# Patient Record
Sex: FEMALE | URBAN - METROPOLITAN AREA
[De-identification: names, ages, dates, MRNs, and addresses within clinical notes are randomized per-mention and may not be internally consistent; named-entity substitution may affect disease eponyms.]

---

## 2017-01-18 ENCOUNTER — APPOINTMENT (RX ONLY)
Dept: URBAN - METROPOLITAN AREA CLINIC 348 | Facility: CLINIC | Age: 57
Setting detail: DERMATOLOGY
End: 2017-01-18

## 2017-01-18 DIAGNOSIS — L82.1 OTHER SEBORRHEIC KERATOSIS: ICD-10-CM

## 2017-01-18 DIAGNOSIS — L81.4 OTHER MELANIN HYPERPIGMENTATION: ICD-10-CM

## 2017-01-18 DIAGNOSIS — D22 MELANOCYTIC NEVI: ICD-10-CM
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
## 2017-01-18 DIAGNOSIS — L57.0 ACTINIC KERATOSIS: ICD-10-CM

## 2017-01-18 DIAGNOSIS — D18.0 HEMANGIOMA: ICD-10-CM

## 2017-01-18 DIAGNOSIS — Z80.8 FAMILY HISTORY OF MALIGNANT NEOPLASM OF OTHER ORGANS OR SYSTEMS: ICD-10-CM

## 2017-01-18 PROBLEM — D22.62 MELANOCYTIC NEVI OF LEFT UPPER LIMB, INCLUDING SHOULDER: Status: ACTIVE | Noted: 2017-01-18

## 2017-01-18 PROBLEM — D18.01 HEMANGIOMA OF SKIN AND SUBCUTANEOUS TISSUE: Status: ACTIVE | Noted: 2017-01-18

## 2017-01-18 PROBLEM — D22.5 MELANOCYTIC NEVI OF TRUNK: Status: ACTIVE | Noted: 2017-01-18

## 2017-01-18 PROBLEM — D22.61 MELANOCYTIC NEVI OF RIGHT UPPER LIMB, INCLUDING SHOULDER: Status: ACTIVE | Noted: 2017-01-18

## 2017-01-18 PROBLEM — D22.71 MELANOCYTIC NEVI OF RIGHT LOWER LIMB, INCLUDING HIP: Status: ACTIVE | Noted: 2017-01-18
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
## 2017-01-18 PROBLEM — D22.72 MELANOCYTIC NEVI OF LEFT LOWER LIMB, INCLUDING HIP: Status: ACTIVE | Noted: 2017-01-18

## 2017-01-18 PROCEDURE — ? LIQUID NITROGEN

## 2017-01-18 PROCEDURE — 17000 DESTRUCT PREMALG LESION: CPT

## 2017-01-18 PROCEDURE — 99202 OFFICE O/P NEW SF 15 MIN: CPT | Mod: 25

## 2017-01-18 PROCEDURE — ? COUNSELING

## 2017-01-18 ASSESSMENT — LOCATION ZONE DERM
LOCATION ZONE: ARM
LOCATION ZONE: TRUNK
LOCATION ZONE: LEG
LOCATION ZONE: NOSE

## 2017-01-18 ASSESSMENT — LOCATION SIMPLE DESCRIPTION DERM
LOCATION SIMPLE: LEFT UPPER BACK
LOCATION SIMPLE: NOSE
LOCATION SIMPLE: CHEST
LOCATION SIMPLE: LEFT THIGH
LOCATION SIMPLE: ABDOMEN
LOCATION SIMPLE: RIGHT FOREARM
LOCATION SIMPLE: RIGHT UPPER BACK
LOCATION SIMPLE: LOWER BACK
LOCATION SIMPLE: RIGHT THIGH
LOCATION SIMPLE: LEFT FOREARM

## 2017-01-18 ASSESSMENT — LOCATION DETAILED DESCRIPTION DERM
LOCATION DETAILED: SUPERIOR LUMBAR SPINE
LOCATION DETAILED: LEFT MEDIAL SUPERIOR CHEST
LOCATION DETAILED: LEFT ANTERIOR PROXIMAL THIGH
LOCATION DETAILED: RIGHT INFERIOR MEDIAL UPPER BACK
LOCATION DETAILED: RIGHT ANTERIOR PROXIMAL THIGH
LOCATION DETAILED: RIGHT SUPERIOR MEDIAL UPPER BACK
LOCATION DETAILED: EPIGASTRIC SKIN
LOCATION DETAILED: LEFT INFERIOR UPPER BACK
LOCATION DETAILED: RIGHT MEDIAL UPPER BACK
LOCATION DETAILED: LEFT DISTAL DORSAL FOREARM
LOCATION DETAILED: LEFT SUPERIOR MEDIAL UPPER BACK
LOCATION DETAILED: NASAL DORSUM
LOCATION DETAILED: RIGHT DISTAL DORSAL FOREARM

## 2017-01-18 NOTE — PROCEDURE: LIQUID NITROGEN
Duration Of Freeze Thaw-Cycle (Seconds): 0
Consent: The patient's consent was obtained including but not limited to risks of crusting, scabbing, blistering, scarring, darker or lighter pigmentary change, recurrence, incomplete removal and infection.
Post-Care Instructions: I reviewed with the patient in detail post-care instructions. Patient is to wear sunprotection, and avoid picking at any of the treated lesions. Pt may apply Vaseline to crusted or scabbing areas.
Render Post-Care Instructions In Note?: no
Detail Level: Detailed

## 2017-01-18 NOTE — HPI: EVALUATION OF SKIN LESION(S)
Hpi Title: Evaluation of Skin Lesions
How Severe Are Your Spot(S)?: mild
Have Your Spot(S) Been Treated In The Past?: has not been treated
Family Member: Sister
Additional History: Patient states she had a previous hx of tanning bed use.

## 2017-03-10 ENCOUNTER — IMPORTED ENCOUNTER (OUTPATIENT)
Dept: URBAN - METROPOLITAN AREA CLINIC 59 | Facility: CLINIC | Age: 57
End: 2017-03-10

## 2017-03-10 PROBLEM — H02.422 MYOGENIC PTOSIS OF LT EYELID: Noted: 2017-03-10

## 2017-03-10 PROBLEM — H04.123 TEAR FILM INSUFFICIENCY OF BILATERAL LACRIMAL GLANDS: Noted: 2017-03-10

## 2017-03-10 PROBLEM — H25.13 BILATERAL NUCLEAR SCLEROSIS CATARACTS: Noted: 2017-03-10

## 2017-03-10 PROBLEM — H40.1232 LOW-TENSION GLAUCOMA, BILATERAL, MODERATE STAGE: Noted: 2017-03-10

## 2017-03-10 PROBLEM — H02.054 TRICHIASIS WITHOUT ENTROPION OF LT UPPER EYELID: Noted: 2017-03-10

## 2017-03-10 PROCEDURE — 67820 REVISE EYELASHES: CPT

## 2017-03-10 PROCEDURE — 92014 COMPRE OPH EXAM EST PT 1/>: CPT

## 2017-03-10 PROCEDURE — 92133 CPTRZD OPH DX IMG PST SGM ON: CPT

## 2018-03-16 ENCOUNTER — IMPORTED ENCOUNTER (OUTPATIENT)
Dept: URBAN - METROPOLITAN AREA CLINIC 59 | Facility: CLINIC | Age: 58
End: 2018-03-16

## 2018-03-16 PROBLEM — H40.1232 LOW-TENSION GLAUCOMA, BILATERAL, MODERATE STAGE: Noted: 2018-03-16

## 2018-03-16 PROBLEM — H04.123 TEAR FILM INSUFFICIENCY OF BILATERAL LACRIMAL GLANDS: Noted: 2018-03-16

## 2018-03-16 PROCEDURE — 92014 COMPRE OPH EXAM EST PT 1/>: CPT

## 2018-03-16 PROCEDURE — 92083 EXTENDED VISUAL FIELD XM: CPT

## 2018-03-16 PROCEDURE — 92133 CPTRZD OPH DX IMG PST SGM ON: CPT

## 2019-04-09 ENCOUNTER — IMPORTED ENCOUNTER (OUTPATIENT)
Dept: URBAN - METROPOLITAN AREA CLINIC 59 | Facility: CLINIC | Age: 59
End: 2019-04-09

## 2019-04-09 PROBLEM — H04.123 TEAR FILM INSUFFICIENCY OF BILATERAL LACRIMAL GLANDS: Noted: 2019-04-09

## 2019-04-09 PROBLEM — H40.1232 LOW-TENSION GLAUCOMA, BILATERAL, MODERATE STAGE: Noted: 2019-04-09

## 2019-04-09 PROBLEM — H25.13 BILATERAL NUCLEAR SCLEROSIS CATARACTS: Noted: 2019-04-09

## 2019-04-09 PROCEDURE — 92133 CPTRZD OPH DX IMG PST SGM ON: CPT

## 2019-04-09 PROCEDURE — 92014 COMPRE OPH EXAM EST PT 1/>: CPT

## 2020-07-14 ENCOUNTER — IMPORTED ENCOUNTER (OUTPATIENT)
Dept: URBAN - METROPOLITAN AREA CLINIC 59 | Facility: CLINIC | Age: 60
End: 2020-07-14

## 2020-07-14 PROBLEM — H40.1232 LOW-TENSION GLAUCOMA, BILATERAL, MODERATE STAGE: Noted: 2020-07-14

## 2020-07-14 PROCEDURE — 92083 EXTENDED VISUAL FIELD XM: CPT

## 2020-07-21 ENCOUNTER — IMPORTED ENCOUNTER (OUTPATIENT)
Dept: URBAN - METROPOLITAN AREA CLINIC 59 | Facility: CLINIC | Age: 60
End: 2020-07-21

## 2020-07-21 PROBLEM — H18.59 OTHER HEREDITARY CORNEAL DYSTROPHY: Noted: 2020-07-21

## 2020-07-21 PROBLEM — D86.0 SARCOIDOSIS OF LUNG: Noted: 2020-07-21

## 2020-07-21 PROBLEM — H40.1232 LOW-TENSION GLAUCOMA, BILATERAL, MODERATE STAGE: Noted: 2020-07-21

## 2020-07-21 PROBLEM — H25.13 BILATERAL NUCLEAR SCLEROSIS CATARACTS: Noted: 2020-07-21

## 2020-07-21 PROCEDURE — 92014 COMPRE OPH EXAM EST PT 1/>: CPT

## 2020-07-21 PROCEDURE — 92133 CPTRZD OPH DX IMG PST SGM ON: CPT

## 2020-12-01 ENCOUNTER — IMPORTED ENCOUNTER (OUTPATIENT)
Dept: URBAN - METROPOLITAN AREA CLINIC 59 | Facility: CLINIC | Age: 60
End: 2020-12-01

## 2020-12-01 PROBLEM — D86.0 SARCOIDOSIS OF LUNG: Noted: 2020-12-01

## 2020-12-01 PROBLEM — H18.593 OTHER HEREDITARY CORNEAL DYSTROPHIES, BILATERAL: Noted: 2020-12-01

## 2020-12-01 PROBLEM — H40.1232 LOW-TENSION GLAUCOMA, BILATERAL, MODERATE STAGE: Noted: 2020-12-01

## 2020-12-01 PROBLEM — H02.821 SEBACEOUS CYST OF RIGHT UPPER EYELID: Noted: 2020-12-01

## 2020-12-01 PROBLEM — H25.13 BILATERAL NUCLEAR SCLEROSIS CATARACTS: Noted: 2020-12-01

## 2020-12-01 PROCEDURE — 92012 INTRM OPH EXAM EST PATIENT: CPT

## 2021-06-29 ENCOUNTER — IMPORTED ENCOUNTER (OUTPATIENT)
Dept: URBAN - METROPOLITAN AREA CLINIC 59 | Facility: CLINIC | Age: 61
End: 2021-06-29

## 2021-06-29 PROBLEM — H40.1232 LOW-TENSION GLAUCOMA, BILATERAL, MODERATE STAGE: Noted: 2021-06-29

## 2021-06-29 PROBLEM — D86.0 SARCOIDOSIS OF LUNG: Noted: 2021-06-29

## 2021-06-29 PROBLEM — H02.422 MYOGENIC PTOSIS OF LT EYELID: Noted: 2021-06-29

## 2021-06-29 PROBLEM — H02.821 SEBACEOUS CYST OF RIGHT UPPER EYELID: Noted: 2021-06-29

## 2021-06-29 PROBLEM — L71.8 OTHER ROSACEA: Noted: 2021-06-29

## 2021-06-29 PROBLEM — H25.13 BILATERAL NUCLEAR SCLEROSIS CATARACTS: Noted: 2021-06-29

## 2021-06-29 PROBLEM — H18.593 OTHER HEREDITARY CORNEAL DYSTROPHIES, BILATERAL: Noted: 2021-06-29

## 2021-06-29 PROCEDURE — 92133 CPTRZD OPH DX IMG PST SGM ON: CPT

## 2021-06-29 PROCEDURE — 92285 EXTERNAL OCULAR PHOTOGRAPHY: CPT

## 2021-06-29 PROCEDURE — 92014 COMPRE OPH EXAM EST PT 1/>: CPT

## 2021-12-07 ENCOUNTER — IMPORTED ENCOUNTER (OUTPATIENT)
Dept: URBAN - METROPOLITAN AREA CLINIC 59 | Facility: CLINIC | Age: 61
End: 2021-12-07

## 2021-12-07 PROBLEM — H25.13 BILATERAL NUCLEAR SCLEROSIS CATARACTS: Noted: 2021-12-07

## 2021-12-07 PROBLEM — H40.1232 LOW-TENSION GLAUCOMA, BILATERAL, MODERATE STAGE: Noted: 2021-12-07

## 2021-12-07 PROBLEM — D86.0 SARCOIDOSIS OF LUNG: Noted: 2021-12-07

## 2021-12-07 PROBLEM — H02.422 MYOGENIC PTOSIS OF LT EYELID: Noted: 2021-12-07

## 2021-12-07 PROBLEM — H02.821 SEBACEOUS CYST OF RIGHT UPPER EYELID: Noted: 2021-12-07

## 2021-12-07 PROBLEM — H18.593 OTHER HEREDITARY CORNEAL DYSTROPHIES, BILATERAL: Noted: 2021-12-07

## 2021-12-07 PROCEDURE — 92012 INTRM OPH EXAM EST PATIENT: CPT

## 2022-07-08 ENCOUNTER — IMPORTED ENCOUNTER (OUTPATIENT)
Dept: URBAN - METROPOLITAN AREA CLINIC 59 | Facility: CLINIC | Age: 62
End: 2022-07-08

## 2022-07-08 PROBLEM — H25.13 BILATERAL NUCLEAR SCLEROSIS CATARACTS: Noted: 2022-07-08

## 2022-07-08 PROBLEM — H02.422 MYOGENIC PTOSIS OF LT EYELID: Noted: 2022-07-08

## 2022-07-08 PROBLEM — H40.1232 LOW-TENSION GLAUCOMA, BILATERAL, MODERATE STAGE: Noted: 2022-07-08

## 2022-07-08 PROBLEM — D86.0 SARCOIDOSIS OF LUNG: Noted: 2022-07-08

## 2022-07-08 PROBLEM — H02.821 SEBACEOUS CYST OF RIGHT UPPER EYELID: Noted: 2022-07-08

## 2022-07-08 PROBLEM — H18.593 OTHER HEREDITARY CORNEAL DYSTROPHIES, BILATERAL: Noted: 2022-07-08

## 2022-07-08 PROBLEM — H02.422 MYOGENIC PTOSIS: Noted: 2022-07-08

## 2022-07-08 PROCEDURE — 92014 COMPRE OPH EXAM EST PT 1/>: CPT

## 2022-07-08 PROCEDURE — 92133 CPTRZD OPH DX IMG PST SGM ON: CPT

## 2023-08-01 ENCOUNTER — ESTABLISHED COMPREHENSIVE EXAM (OUTPATIENT)
Dept: URBAN - METROPOLITAN AREA CLINIC 22 | Facility: CLINIC | Age: 63
End: 2023-08-01

## 2023-08-01 DIAGNOSIS — H02.422: ICD-10-CM

## 2023-08-01 DIAGNOSIS — D86.0: ICD-10-CM

## 2023-08-01 DIAGNOSIS — H18.593: ICD-10-CM

## 2023-08-01 DIAGNOSIS — H40.1232: ICD-10-CM

## 2023-08-01 DIAGNOSIS — H00.12: ICD-10-CM

## 2023-08-01 DIAGNOSIS — H00.11: ICD-10-CM

## 2023-08-01 DIAGNOSIS — H25.13: ICD-10-CM

## 2023-08-01 PROCEDURE — 92014 COMPRE OPH EXAM EST PT 1/>: CPT

## 2023-08-01 PROCEDURE — 92083 EXTENDED VISUAL FIELD XM: CPT

## 2023-08-01 PROCEDURE — 92285 EXTERNAL OCULAR PHOTOGRAPHY: CPT

## 2023-08-01 ASSESSMENT — TONOMETRY
OD_IOP_MMHG: 14
OS_IOP_MMHG: 14

## 2023-08-01 ASSESSMENT — VISUAL ACUITY
OS_SC: 20/20-1
OD_SC: 20/20-1

## 2023-08-15 ENCOUNTER — PROCEDURE ONLY (OUTPATIENT)
Dept: URBAN - METROPOLITAN AREA CLINIC 22 | Facility: CLINIC | Age: 63
End: 2023-08-15

## 2023-08-15 DIAGNOSIS — H00.12: ICD-10-CM

## 2023-08-15 DIAGNOSIS — H00.11: ICD-10-CM

## 2023-08-15 PROCEDURE — 67805 REMOVE EYELID LESIONS: CPT

## 2023-10-21 ASSESSMENT — VISUAL ACUITY
OD_SC: 20/20
OS_SC: 20/20
OD_SC: 20/20
OD_SC: 20/20
OS_SC: 20/20
OS_SC: 20/20
OS_SC: 20/20-1
OS_SC: 20/20-1
OD_SC: 20/20
OD_SC: 20/20-1
OS_SC: 20/20
OD_SC: 20/20-1
OS_SC: 20/20-1
OD_SC: 20/20-3

## 2023-10-21 ASSESSMENT — PACHYMETRY
OS_CT_UM: C:  FINAL: 555.000; P:
OS_CT_UM: C:  FINAL: 555.000; P:
OD_CT_UM: C:  FINAL: 555.000; P:
OD_CT_UM: C:  FINAL: 555.000; P:

## 2023-10-21 ASSESSMENT — TONOMETRY
OD_IOP_MMHG: 14
OD_IOP_MMHG: 18
OS_IOP_MMHG: 16
OS_IOP_MMHG: 16
OS_IOP_MMHG: 14
OD_IOP_MMHG: 15
OS_IOP_MMHG: 15
OD_IOP_MMHG: 15
OD_IOP_MMHG: 16
OS_IOP_MMHG: 14
OS_IOP_MMHG: 16
OD_IOP_MMHG: 14

## 2024-03-19 ENCOUNTER — ESTABLISHED COMPREHENSIVE EXAM (OUTPATIENT)
Dept: URBAN - METROPOLITAN AREA CLINIC 22 | Facility: CLINIC | Age: 64
End: 2024-03-19

## 2024-03-19 DIAGNOSIS — D86.0: ICD-10-CM

## 2024-03-19 DIAGNOSIS — H18.593: ICD-10-CM

## 2024-03-19 DIAGNOSIS — H02.422: ICD-10-CM

## 2024-03-19 DIAGNOSIS — H40.1232: ICD-10-CM

## 2024-03-19 DIAGNOSIS — H25.13: ICD-10-CM

## 2024-03-19 DIAGNOSIS — H04.123: ICD-10-CM

## 2024-03-19 PROCEDURE — 92012 INTRM OPH EXAM EST PATIENT: CPT

## 2024-03-19 PROCEDURE — 92133 CPTRZD OPH DX IMG PST SGM ON: CPT

## 2024-03-19 ASSESSMENT — TONOMETRY
OS_IOP_MMHG: 17
OD_IOP_MMHG: 16

## 2024-03-19 ASSESSMENT — VISUAL ACUITY
OD_SC: 20/20-2
OS_SC: 20/20-2

## (undated) RX ORDER — NEOMYCIN SULFATE, POLYMYXIN B SULFATE AND DEXAMETHASONE 3.5; 10000; 1 MG/G; [USP'U]/G; MG/G: OINTMENT OPHTHALMIC TWICE A DAY